# Patient Record
Sex: FEMALE
[De-identification: names, ages, dates, MRNs, and addresses within clinical notes are randomized per-mention and may not be internally consistent; named-entity substitution may affect disease eponyms.]

---

## 2020-01-10 ENCOUNTER — HOSPITAL ENCOUNTER (INPATIENT)
Dept: HOSPITAL 47 - EC | Age: 40
LOS: 3 days | Discharge: HOME | DRG: 388 | End: 2020-01-13
Attending: HOSPITALIST | Admitting: HOSPITALIST
Payer: COMMERCIAL

## 2020-01-10 DIAGNOSIS — G92: ICD-10-CM

## 2020-01-10 DIAGNOSIS — D72.829: ICD-10-CM

## 2020-01-10 DIAGNOSIS — Z98.891: ICD-10-CM

## 2020-01-10 DIAGNOSIS — T40.2X5A: ICD-10-CM

## 2020-01-10 DIAGNOSIS — Z88.6: ICD-10-CM

## 2020-01-10 DIAGNOSIS — Z71.6: ICD-10-CM

## 2020-01-10 DIAGNOSIS — Z98.890: ICD-10-CM

## 2020-01-10 DIAGNOSIS — N83.291: ICD-10-CM

## 2020-01-10 DIAGNOSIS — F11.10: ICD-10-CM

## 2020-01-10 DIAGNOSIS — Z82.5: ICD-10-CM

## 2020-01-10 DIAGNOSIS — K59.03: ICD-10-CM

## 2020-01-10 DIAGNOSIS — F17.200: ICD-10-CM

## 2020-01-10 DIAGNOSIS — K56.7: Primary | ICD-10-CM

## 2020-01-10 DIAGNOSIS — D47.3: ICD-10-CM

## 2020-01-10 DIAGNOSIS — J45.909: ICD-10-CM

## 2020-01-10 DIAGNOSIS — Z83.3: ICD-10-CM

## 2020-01-10 DIAGNOSIS — Z83.79: ICD-10-CM

## 2020-01-10 DIAGNOSIS — Z82.49: ICD-10-CM

## 2020-01-10 LAB
ALBUMIN SERPL-MCNC: 4.3 G/DL (ref 3.5–5)
ALP SERPL-CCNC: 65 U/L (ref 38–126)
ALT SERPL-CCNC: 32 U/L (ref 4–34)
AMMONIA PLAS-SCNC: 35 UMOL/L (ref ?–30)
ANION GAP SERPL CALC-SCNC: 8 MMOL/L
APAP SPEC-MCNC: <10 UG/ML
AST SERPL-CCNC: 36 U/L (ref 14–36)
BASOPHILS # BLD AUTO: 0.2 K/UL (ref 0–0.2)
BASOPHILS NFR BLD AUTO: 2 %
BUN SERPL-SCNC: 9 MG/DL (ref 7–17)
CALCIUM SPEC-MCNC: 9.5 MG/DL (ref 8.4–10.2)
CHLORIDE SERPL-SCNC: 104 MMOL/L (ref 98–107)
CK SERPL-CCNC: 26 U/L (ref 30–135)
CO2 SERPL-SCNC: 25 MMOL/L (ref 22–30)
EOSINOPHIL # BLD AUTO: 0.2 K/UL (ref 0–0.7)
EOSINOPHIL NFR BLD AUTO: 2 %
ERYTHROCYTE [DISTWIDTH] IN BLOOD BY AUTOMATED COUNT: 4.98 M/UL (ref 3.8–5.4)
ERYTHROCYTE [DISTWIDTH] IN BLOOD: 15.3 % (ref 11.5–15.5)
GLUCOSE SERPL-MCNC: 93 MG/DL (ref 74–99)
HCT VFR BLD AUTO: 46.8 % (ref 34–46)
HGB BLD-MCNC: 14.8 GM/DL (ref 11.4–16)
LACTATE BLDV-SCNC: 1.2 MMOL/L (ref 0.7–2)
LYMPHOCYTES # SPEC AUTO: 1.8 K/UL (ref 1–4.8)
LYMPHOCYTES NFR SPEC AUTO: 17 %
MCH RBC QN AUTO: 29.7 PG (ref 25–35)
MCHC RBC AUTO-ENTMCNC: 31.6 G/DL (ref 31–37)
MCV RBC AUTO: 94 FL (ref 80–100)
MONOCYTES # BLD AUTO: 0.5 K/UL (ref 0–1)
MONOCYTES NFR BLD AUTO: 5 %
NEUTROPHILS # BLD AUTO: 8.2 K/UL (ref 1.3–7.7)
NEUTROPHILS NFR BLD AUTO: 74 %
PH UR: 8 [PH] (ref 5–8)
PLATELET # BLD AUTO: 561 K/UL (ref 150–450)
POTASSIUM SERPL-SCNC: 4.8 MMOL/L (ref 3.5–5.1)
PROT SERPL-MCNC: 8.3 G/DL (ref 6.3–8.2)
RBC UR QL: 1 /HPF (ref 0–5)
SALICYLATES SERPL-MCNC: <1 MG/DL
SODIUM SERPL-SCNC: 137 MMOL/L (ref 137–145)
SP GR UR: 1.01 (ref 1–1.03)
SQUAMOUS UR QL AUTO: 9 /HPF (ref 0–4)
UROBILINOGEN UR QL STRIP: <2 MG/DL (ref ?–2)
WBC # BLD AUTO: 11.1 K/UL (ref 3.8–10.6)
WBC # UR AUTO: 8 /HPF (ref 0–5)

## 2020-01-10 PROCEDURE — 74022 RADEX COMPL AQT ABD SERIES: CPT

## 2020-01-10 PROCEDURE — 80053 COMPREHEN METABOLIC PANEL: CPT

## 2020-01-10 PROCEDURE — 81025 URINE PREGNANCY TEST: CPT

## 2020-01-10 PROCEDURE — 83520 IMMUNOASSAY QUANT NOS NONAB: CPT

## 2020-01-10 PROCEDURE — 93975 VASCULAR STUDY: CPT

## 2020-01-10 PROCEDURE — 82140 ASSAY OF AMMONIA: CPT

## 2020-01-10 PROCEDURE — 80048 BASIC METABOLIC PNL TOTAL CA: CPT

## 2020-01-10 PROCEDURE — 96360 HYDRATION IV INFUSION INIT: CPT

## 2020-01-10 PROCEDURE — 81001 URINALYSIS AUTO W/SCOPE: CPT

## 2020-01-10 PROCEDURE — 85025 COMPLETE CBC W/AUTO DIFF WBC: CPT

## 2020-01-10 PROCEDURE — 85027 COMPLETE CBC AUTOMATED: CPT

## 2020-01-10 PROCEDURE — 76830 TRANSVAGINAL US NON-OB: CPT

## 2020-01-10 PROCEDURE — 80329 ANALGESICS NON-OPIOID 1 OR 2: CPT

## 2020-01-10 PROCEDURE — 99285 EMERGENCY DEPT VISIT HI MDM: CPT

## 2020-01-10 PROCEDURE — 82550 ASSAY OF CK (CPK): CPT

## 2020-01-10 PROCEDURE — 80320 DRUG SCREEN QUANTALCOHOLS: CPT

## 2020-01-10 PROCEDURE — 96361 HYDRATE IV INFUSION ADD-ON: CPT

## 2020-01-10 PROCEDURE — 74177 CT ABD & PELVIS W/CONTRAST: CPT

## 2020-01-10 PROCEDURE — 36415 COLL VENOUS BLD VENIPUNCTURE: CPT

## 2020-01-10 PROCEDURE — 83605 ASSAY OF LACTIC ACID: CPT

## 2020-01-10 PROCEDURE — 93005 ELECTROCARDIOGRAM TRACING: CPT

## 2020-01-10 PROCEDURE — 70450 CT HEAD/BRAIN W/O DYE: CPT

## 2020-01-10 PROCEDURE — 80306 DRUG TEST PRSMV INSTRMNT: CPT

## 2020-01-10 RX ADMIN — ACETAMINOPHEN PRN MLS/HR: 10 INJECTION, SOLUTION INTRAVENOUS at 20:24

## 2020-01-10 RX ADMIN — CEFAZOLIN SCH MLS/HR: 330 INJECTION, POWDER, FOR SOLUTION INTRAMUSCULAR; INTRAVENOUS at 18:07
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
## 2020-01-10 NOTE — CT
EXAMINATION TYPE: CT abdomen pelvis w con

 

DATE OF EXAM: 1/10/2020

 

COMPARISON: CT abdomen pelvis 1/7/2020

 

INDICATION: obstruction

 

DLP: 884.3 mGycm, Automated exposure control for dose reduction was used.

 

CONTRAST:  100 mL of Isovue 300. 

                        Study performed without Oral Contrast

 

TECHNIQUE: Axial images were obtained from above the diaphragm to the pubic rami in the axial plane a
t 5 mm thick sections.  Reconstructed images are reviewed on the computer in the coronal plane.  

 

FINDINGS:

 

Limited CT sections are obtained the lung bases.  The lung bases are clear.

 

CT ABDOMEN:

 

Liver: Normal

 

Spleen: Normal

 

Pancreas: Normal

 

Adrenal glands: The adrenal glands are normal.

 

Gallbladder: Normal  

 

Kidneys: No masses are evident. No hydronephrosis is present.   Bilateral renal cysts are present.  D
elayed images were obtained through the kidneys, which remain unremarkable.

 

Aorta: Normal

 

Inferior vena cava: Normal.

 

CT PELVIS: 

There is some fluid-filled loops of bowel within the pelvis. There are loops of bowel which are incom
pletely distended or lack oral contrast limiting their evaluation.

 

Appendix: Normal as visualized.

 

Urinary bladder: Normal. 

 

Genitourinary structures: Uterus appears normal. Multiple fluid filled areas are within the right adn
exa likely cysts. The largest measures 4.9 cm. Left ovary may contain a couple small cysts.

 

Osseous structures: No suspicious lytic or sclerotic lesions.

 

IMPRESSIONS:

1.  Bilateral ovarian cysts larger on the right. Additional workup with pelvic ultrasound is recommen
ded. Transvaginal ultrasound may have better visualization given the large amount of bowel gas presen
t at this time. Findings appear stable from 1/7/2020.

## 2020-01-10 NOTE — CT
EXAMINATION TYPE: CT brain wo con

 

DATE OF EXAM: 1/10/2020

 

COMPARISON: 9/6/2012

 

HISTORY: Weakness.

 

CT DLP: 1173.4 mGycm.  Automated Exposure Control for Dose Reduction was Utilized.

 

 

TECHNIQUE: CT scan of the head is performed without contrast.

 

FINDINGS:   There is no acute intracranial hemorrhage, mass effect, or midline shift identified.  The
 ventricles and sulci are within normal limits in size.  The globes are intact and the visualized sin
uses are clear.

 

IMPRESSION:  No acute intracranial hemorrhage, mass effect, or midline shift is seen.

## 2020-01-10 NOTE — XR
EXAMINATION TYPE: XR abdomen acute w cxr

 

DATE OF EXAM: 1/10/2020

 

COMPARISON: 1/7/2020

 

HISTORY: Abdomen pain

 

TECHNIQUE: Abdomen is examined in the supine and upright views exam is supplemented with a frontal ch
est.

 

FINDINGS: Heart size is normal. Pulmonary vasculature is normal. The lungs are clear.

 

No free air is under the diaphragm. No suspicious air-fluid levels or differential air-fluid levels a
re present. Marked fecal debris is throughout the colon to the descending colon region.

 

IMPRESSION:

1.  Marked fecal retention through the ascending transverse and descending colon.

## 2020-01-11 LAB
ANION GAP SERPL CALC-SCNC: 6 MMOL/L
BUN SERPL-SCNC: 10 MG/DL (ref 7–17)
CALCIUM SPEC-MCNC: 9 MG/DL (ref 8.4–10.2)
CHLORIDE SERPL-SCNC: 108 MMOL/L (ref 98–107)
CO2 SERPL-SCNC: 22 MMOL/L (ref 22–30)
ERYTHROCYTE [DISTWIDTH] IN BLOOD BY AUTOMATED COUNT: 4.34 M/UL (ref 3.8–5.4)
ERYTHROCYTE [DISTWIDTH] IN BLOOD: 13.7 % (ref 11.5–15.5)
GLUCOSE SERPL-MCNC: 81 MG/DL (ref 74–99)
HCT VFR BLD AUTO: 41.1 % (ref 34–46)
HGB BLD-MCNC: 13.1 GM/DL (ref 11.4–16)
MCH RBC QN AUTO: 30.2 PG (ref 25–35)
MCHC RBC AUTO-ENTMCNC: 31.8 G/DL (ref 31–37)
MCV RBC AUTO: 94.7 FL (ref 80–100)
PLATELET # BLD AUTO: 443 K/UL (ref 150–450)
POTASSIUM SERPL-SCNC: 4.8 MMOL/L (ref 3.5–5.1)
SODIUM SERPL-SCNC: 136 MMOL/L (ref 137–145)
WBC # BLD AUTO: 8.4 K/UL (ref 3.8–10.6)

## 2020-01-11 RX ADMIN — ACETAMINOPHEN PRN MLS/HR: 10 INJECTION, SOLUTION INTRAVENOUS at 12:54

## 2020-01-11 RX ADMIN — CEFAZOLIN SCH MLS/HR: 330 INJECTION, POWDER, FOR SOLUTION INTRAMUSCULAR; INTRAVENOUS at 07:08

## 2020-01-11 RX ADMIN — CEFAZOLIN SCH MLS/HR: 330 INJECTION, POWDER, FOR SOLUTION INTRAMUSCULAR; INTRAVENOUS at 16:51

## 2020-01-11 RX ADMIN — CEFAZOLIN SCH: 330 INJECTION, POWDER, FOR SOLUTION INTRAMUSCULAR; INTRAVENOUS at 22:18

## 2020-01-11 RX ADMIN — CEFAZOLIN SCH MLS/HR: 330 INJECTION, POWDER, FOR SOLUTION INTRAMUSCULAR; INTRAVENOUS at 00:05

## 2020-01-11 RX ADMIN — PANTOPRAZOLE SODIUM SCH MG: 40 INJECTION, POWDER, FOR SOLUTION INTRAVENOUS at 07:07

## 2020-01-11 RX ADMIN — ACETAMINOPHEN PRN MLS/HR: 10 INJECTION, SOLUTION INTRAVENOUS at 04:17

## 2020-01-11 NOTE — US
EXAMINATION TYPE: US transvaginal

 

DATE OF EXAM: 1/11/2020

 

COMPARISON: Previous study dated 12/21/2012.

 

CLINICAL HISTORY: ovarian cyst abominal pain .

 

TECHNIQUE:  Transvaginal (TV).  

 

Date of LMP:  01/09/2020

 

EXAM MEASUREMENTS:

 

Uterus:  9.6 x 4.2 x 7.0  cm

Endometrial Stripe: 1.6 cm

Right Ovary:  8.4 x 3.9 x 7.4 cm

Left Ovary:  4.2 x 1.8 x 2.3 cm

 

Technically difficult exam performed portably on patient with chains.

 

1. Uterus:  Anteverted   wnl

2. Endometrium:  measures 1.6 cm, patient states LMP 3 days ago 

3. Right Ovary:  large septated cyst measures 6.7 x 3.7 x 5.3 cm

4. Left Ovary:

**Spectral, color and waveform doppler imaging shows good arterial and venous flow within the ovaries
; there is no evidence for ovarian torsion.

5. Bilateral Adnexa:  wnl

6. Posterior cul-de-sac:  no free fluid

 

IMPRESSION: 

1. ENLARGED ENDOMETRIAL STRIPE. FOLLOW-UP IS SUGGESTED TO ENSURE RETURNED TO NORMAL.

2. COMPLEX 6.7 CM RIGHT OVARIAN CYST.

## 2020-01-11 NOTE — P.PN
Subjective


Progress Note Date: 01/11/20


Principal diagnosis: 


abd pain


Patient is a 38 yo  female with a hx of asthma, tobacco abuse, 

polysubstance abuse, and polynephritis who presented to the emergency department

from [facility] due to altered mentation.  She was seen in the ER on the evening of 

1/7/20 secondary to having possibly swallowed a bag of heroin at that point in 

time she had a computed tomography scan done which showed constipation and fecal

impaction as well as a large ovarian cyst.   At that point in time her UDS was 

positive for meth, opiates, and cocaine.  She was ultimately cleared by the 

emergency department to return to [facility].  She was brought back in on 1/10/2020 

secondary to altered mentation and worsening abdominal pain.  Repeat computed 

tomography scan of the abdomen and pelvis showed dilated small bowel loops with 

no transition point and increased fecal retention as well as an ovarian cyst.  

She was admitted for further monitoring with concern that she would develop a 

complete obstruction or possible increased absorption of the drugs.





Patient seen and examined at bedside.  She reports abdominal pain.  Initially 

she was sleeping when I entered the room.  She is asking for more pain 

medications to control her pain and a diet but complaining of her abdominal 

pain.  She denies any nausea or vomiting.  We did discuss that she has ovarian 

cysts and will need outpatient follow-up with OB/GYN and a transvaginal 

ultrasound.








Objective





- Vital Signs


Vital signs: 


                                   Vital Signs











Temp  98.0 F   01/11/20 13:49


 


Pulse  73   01/11/20 13:49


 


Resp  18   01/11/20 13:49


 


BP  109/73   01/11/20 13:49


 


Pulse Ox  97   01/11/20 13:49








                                 Intake & Output











 01/10/20 01/11/20 01/11/20





 18:59 06:59 18:59


 


Intake Total  0 0


 


Balance  0 0


 


Weight 72.575 kg 72.575 kg 


 


Intake:   


 


  Oral  0 0


 


Other:   


 


  Voiding Method   Toilet


 


  # Voids  0 0














- Exam


General: non toxic, no distress, appears at stated age


Derm: multiple circular scars on upper arms, warm, dry


Head: atraumatic, normocephalic, symmetric


Eyes: EOMI, no lid lag, anicteric sclera


Mouth: no lip lesion, mucus membranes moist


Cardiovascular: S1S2 reg, no murmur, positive posterior tibial pulse bilateral, 


Lungs: Decreased bs bilateral, no rhonchi, no rales , no accessory muscle use


Abdominal: soft,  + tender to palpation diffusely, no guarding, no appreciable 

organomegaly


Ext: no gross muscle atrophy, no edema, no contractures


Neuro:  CN II-XI grossly intact, no focal neuro deficits


Psych: Alert, oriented, lethargic 











- Labs


CBC & Chem 7: 


                                 01/11/20 11:47





                                 01/11/20 11:47


Labs: 


                  Abnormal Lab Results - Last 24 Hours (Table)











  01/10/20 01/11/20 Range/Units





  13:55 11:47 


 


Sodium   136 L  (137-145)  mmol/L


 


Chloride   108 H  ()  mmol/L


 


Urine Appearance  Cloudy H   (Clear)  


 


Urine WBC  8 H   (0-5)  /hpf


 


Ur Squamous Epith Cells  9 H   (0-4)  /hpf


 


Amorphous Sediment  Rare H   (None)  /hpf


 


Urine Bacteria  Rare H   (None)  /hpf


 


Urine Mucus  Rare H   (None)  /hpf


 


Urine Cocaine Screen  Detected H   (NotDetected)  














Assessment and Plan


Assessment: 


illeus


- Possible ingestion of fatty containing heroin which may be leading to ileus 

versus partial obstruction


-Continue to monitor for fecal output


-Await general surgery recommendations


-Avoid additional narcotic agents at this point in time


-Maintain nothing by mouth diet until seen by surgery





Complex right ovarian cyst with enlarged endometrial stripe


- Recommend outpatient GYN for possible cyst excision 





Altered mentation


- appears to be improved


- continue to follow likely either due to intoxication or withdrawal 





Polysubstance abuse


- Attempt to limit any sedative or narcotic medications.  Attempt pain control 

with IV acetaminophen and Toradol if no plans for surgical intervention.





Tobacco abuse


- cessation


- nicotine replacement 





Leukocytosis, resolved





DVT prophylaxis: SCDs


Discussed with: Patient, nursing


Anticipated discharge: in AM


Anticipated discharge place: return to [facility]


A total of 25 minutes was spent on the care of this complex patient more than 

50% of the time was spent in counseling and care coordination.
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
## 2020-01-12 VITALS — RESPIRATION RATE: 16 BRPM

## 2020-01-12 LAB
ANION GAP SERPL CALC-SCNC: 4 MMOL/L
BUN SERPL-SCNC: 9 MG/DL (ref 7–17)
CALCIUM SPEC-MCNC: 8.8 MG/DL (ref 8.4–10.2)
CHLORIDE SERPL-SCNC: 111 MMOL/L (ref 98–107)
CO2 SERPL-SCNC: 23 MMOL/L (ref 22–30)
ERYTHROCYTE [DISTWIDTH] IN BLOOD BY AUTOMATED COUNT: 4.15 M/UL (ref 3.8–5.4)
ERYTHROCYTE [DISTWIDTH] IN BLOOD: 13.7 % (ref 11.5–15.5)
GLUCOSE SERPL-MCNC: 80 MG/DL (ref 74–99)
HCT VFR BLD AUTO: 38.6 % (ref 34–46)
HGB BLD-MCNC: 12.6 GM/DL (ref 11.4–16)
MCH RBC QN AUTO: 30.3 PG (ref 25–35)
MCHC RBC AUTO-ENTMCNC: 32.6 G/DL (ref 31–37)
MCV RBC AUTO: 93 FL (ref 80–100)
PLATELET # BLD AUTO: 399 K/UL (ref 150–450)
POTASSIUM SERPL-SCNC: 4.5 MMOL/L (ref 3.5–5.1)
SODIUM SERPL-SCNC: 138 MMOL/L (ref 137–145)
WBC # BLD AUTO: 6 K/UL (ref 3.8–10.6)

## 2020-01-12 RX ADMIN — CEFAZOLIN SCH: 330 INJECTION, POWDER, FOR SOLUTION INTRAMUSCULAR; INTRAVENOUS at 14:25

## 2020-01-12 RX ADMIN — CEFAZOLIN SCH: 330 INJECTION, POWDER, FOR SOLUTION INTRAMUSCULAR; INTRAVENOUS at 05:58

## 2020-01-12 RX ADMIN — PANTOPRAZOLE SODIUM SCH MG: 40 INJECTION, POWDER, FOR SOLUTION INTRAVENOUS at 06:49

## 2020-01-12 NOTE — XR
EXAMINATION TYPE: XR abdomen acute w cxr

 

DATE OF EXAM: 1/12/2020

 

COMPARISON: 1/10/2020

 

HISTORY: Abdominal pain

 

TECHNIQUE: Chest x-ray with supine and upright abdomen 4 views

 

FINDINGS: There is no heart failure nor confluent pneumonic infiltrate. Costophrenic angles are clear
. There are no hilar masses. Bowel gas pattern is normal. There is no sign of intestinal obstruction 
or pneumoperitoneum. Fecal pattern is normal. There is no evidence of a mass. There are no pathologic
 calcifications over the kidneys.

 

IMPRESSION: Normal chest. Nonacute abdomen. There is decreased fecal material compared to old exam.

## 2020-01-12 NOTE — P.PN
Subjective


Progress Note Date: 01/12/20 (delayed chartin seen at 1230)


Principal diagnosis: 


abd pain


Patient is a 38 yo  female with a hx of asthma, tobacco abuse, 

polysubstance abuse, and prior polynephritis who presented to the emergency 

department from [facility] due to altered mentation.  She was seen in the ER on the 

evening of 1/7/20 secondary to having possibly swallowed a bag of heroin at that

point in time she had a computed tomography scan done which showed constipation 

and fecal impaction as well as a large ovarian cyst.   At that point in time her

UDS was positive for meth, opiates, and cocaine.  She was ultimately cleared by 

the emergency department to return to [facility].  She was brought back in on 1

/10/2020 secondary to altered mentation and worsening abdominal pain.  Repeat 

computed tomography scan of the abdomen and pelvis showed dilated small bowel 

loops with no transition point and increased fecal retention as well as an 

ovarian cyst.  She was admitted for further monitoring with concern that she 

would develop a complete obstruction or possible increased absorption of the 

drugs. Seen by surgery with no plans for surgical intervention. 





Patient seen and examined at bedside.  She complains of abd pain and points that

it is both on the right and the left side. No nausea. Asking for increased pain 

medications and an increased diet. 


Explained that we would not be using an opiate pain medications due to not 

knowing if she passed the bag she had swallowed and what was in the bag. I also 

informed her that due to the amount and location of stool on her CT we would not

be increasing her diet until she had a bowel movement. 





Objective





- Vital Signs


Vital signs: 


                                   Vital Signs











Temp  97.7 F   01/12/20 14:05


 


Pulse  68   01/12/20 14:05


 


Resp  18   01/12/20 14:05


 


BP  110/73   01/12/20 14:05


 


Pulse Ox  99   01/12/20 14:05








                                 Intake & Output











 01/11/20 01/12/20 01/12/20





 18:59 06:59 18:59


 


Intake Total 0 875 


 


Balance 0 875 


 


Intake:   


 


  Oral 0 875 


 


Other:   


 


  Voiding Method Toilet Toilet Toilet


 


  # Voids 0 2 2














- Exam


General: non toxic, no distress, appears at stated age


Derm: multiple circular scars on upper arms, warm, dry


Head: atraumatic, normocephalic, symmetric


Eyes: EOMI, no lid lag, anicteric sclera


Mouth: no lip lesion, mucus membranes moist


Cardiovascular: S1S2 reg, no murmur, positive posterior tibial pulse bilateral, 


Lungs: CTA bilateral, no rhonchi, no rales , no accessory muscle use


Abdominal: soft,  + tender to palpation diffusely, no guarding, no appreciable 

organomegaly


Ext: no gross muscle atrophy, no edema, no contractures


Neuro:  CN II-XI grossly intact, no focal neuro deficits


Psych: Alert, oriented, lethargic 











- Labs


CBC & Chem 7: 


                                 01/12/20 07:51





                                 01/12/20 07:51


Labs: 


                  Abnormal Lab Results - Last 24 Hours (Table)











  01/12/20 Range/Units





  07:51 


 


Chloride  111 H  ()  mmol/L














Assessment and Plan


Assessment: 


illeus


- Possible ingestion of bag containing heroin which may be leading to ileus 

versus partial obstruction


   - check acute abdominal series 


-Continue to monitor for fecal output


-general surgery recommendations appreciated 


-Avoid additional narcotic agents at this point in time


-Tylenol as need for pain considering motrin allergy


- clear liquid diet until she has a bowel movement. 





Complex right ovarian cyst with enlarged endometrial stripe


- Recommend outpatient GYN for possible cyst excision/Evaluation 





Polysubstance abuse


- Attempt to limit any sedative or narcotic medications.  Attempt pain control 

with IV acetaminophen and Toradol if no plans for surgical intervention.





Tobacco abuse


- cessation


- nicotine replacement 





Leukocytosis, resolved


Toxic encephalopathy, resolved 








Back to [facility] once we verify that she is passing stool





DVT prophylaxis: SCDs


Discussed with: Patient, nursing


Anticipated discharge: in AM


Anticipated discharge place: return to [facility]


A total of 25 minutes was spent on the care of this complex patient more than 

50% of the time was spent in counseling and care coordination.

## 2020-01-13 VITALS — SYSTOLIC BLOOD PRESSURE: 100 MMHG | HEART RATE: 67 BPM | DIASTOLIC BLOOD PRESSURE: 63 MMHG | TEMPERATURE: 98.6 F

## 2020-01-13 RX ADMIN — PANTOPRAZOLE SODIUM SCH: 40 INJECTION, POWDER, FOR SOLUTION INTRAVENOUS at 09:13

## 2020-01-13 NOTE — P.PN
Subjective


Progress Note Date: 01/12/20





Patient had BM. Tolerating clears. Abdominal pain improved





Objective





- Vital Signs


Vital signs: 


                                   Vital Signs











Temp  97.7 F   01/13/20 05:37


 


Pulse  61   01/13/20 05:37


 


Resp  16   01/13/20 05:37


 


BP  99/64   01/13/20 05:37


 


Pulse Ox  99   01/13/20 05:37








                                 Intake & Output











 01/12/20 01/13/20 01/13/20





 18:59 06:59 18:59


 


Intake Total 300  


 


Balance 300  


 


Intake:   


 


  Oral 300  


 


Other:   


 


  Voiding Method Toilet Toilet 


 


  # Voids 2 2 














- Constitutional


General appearance: Present: cooperative





- Respiratory


Details: 





nonlabored





- Cardiovascular


Rhythm: regular





- Gastrointestinal


Gastrointestinal Comment(s): 





S/NT/ND





- Labs


CBC & Chem 7: 


                                 01/12/20 07:51





                                 01/12/20 07:51





Assessment and Plan


Assessment: 


Abdominal pain


Constipation


Plan: 


Advance diet as tolerated. Patient is having BM and pain is resolved. No plans 

for surgical intervention. Cleared from surgical standpoint. Continue with stool

softeners and suppository as needed.

## 2020-01-13 NOTE — P.DS
Providers


Date of admission: 


01/12/20 14:07





Attending physician: 


Joseph Kessler MD





Consults: 





                                        





01/10/20 17:38


Consult Physician Urgent 


   Consulting Provider: Jose Armando Garza


   Consult Reason/Comments: abd pain, drug ingestion, ileus


   Do you want consulting provider notified?: Already Contacted











Primary care physician: 


Stated None





Hospital Course: 





Date of discharge: 01/13/10 intensity





Consultants: Gen. surgery





Reason for admission:


Abdominal pain, ileus





Discharge diagnosis:


1.  No specific ileus likely from opiate abuse and constipation


2.  Opioid abuse


3.  Constipation








History of present illness:


Patient is a 38 yo  female with a hx of asthma, tobacco abuse, 

polysubstance abuse, and prior polynephritis who presented to the emergency 

department from half-way due to altered mentation.  She was seen in the ER on the 

evening of 1/7/20 secondary to having possibly swallowed a bag of heroin at that

point in time she had a computed tomography scan done which showed constipation 

and fecal impaction as well as a large ovarian cyst.   At that point in time her

UDS was positive for meth, opiates, and cocaine.  She was ultimately cleared by 

the emergency department to return to half-way.  She was brought back in on 

1/10/2020 secondary to altered mentation and worsening abdominal pain.  Repeat 

computed tomography scan of the abdomen and pelvis showed dilated small bowel 

loops with no transition point and increased fecal retention as well as an 

ovarian cyst.  She was admitted for further monitoring with concern that she 

would develop a complete obstruction or possible increased absorption of the 

drugs. Seen by surgery with no plans for surgical intervention. 





Hospital course:


Patient was monitored here in the hospital for 3 days.  She was placed nothing 

by mouth.  General surgery valid to the patient.  She was given IV fluids.


Patient remained asymptomatic with normal mentation and no abdominal pain.  She 

does have a nausea or vomiting.  She had a normal bowel movement.  She was 

passing gas.  General surgery started the diet and cleared the patient for 

discharge.  Patient tolerated regular diet without any difficulties.  In 

discussion with the patient she did not want to reveal what she took.  Further 

investigation during this stay straightaway from disability that she ingested a 

hearing pocket.  Probably she was just using straight drugs.


Repeat x-ray of the abdomen showed normal gas pattern without any significant 

acute changes.


Physical examination under discharge was nonfocal.  Her abdomen is soft 

nontender nondistended bowel sounds are present.  Her mentation was normal 

pupils were normal and reactive to light.





Patient was discharge back to half-way.





Given the finding of the large complex right ovarian cyst I advised patient to 

follow-up with gynecologist as soon as possible and that recommendation was 

placed in her discharge paperwork.





Patient Condition at Discharge: Serious





Plan - Discharge Summary


New Discharge Prescriptions: 


No Action


   Sulfamethox-Tmp 800-160Mg [Bactrim -160 mg] 1 tab PO Q12HR


   Ibuprofen [Motrin Ib] 400 mg PO BID PRN


     PRN Reason: Pain


Discharge Medication List





Ibuprofen [Motrin Ib] 400 mg PO BID PRN 01/10/20 [History]


Sulfamethox-Tmp 800-160Mg [Bactrim -160 mg] 1 tab PO Q12HR 01/10/20 

[History]








Follow up Appointment(s)/Referral(s): 


None,Stated [Primary Care Provider] - 1-2 days

## 2020-01-13 NOTE — P.PN
Subjective





Chart was reviewed patient was examined.


Today she's doing about the same.  She had bowel movements yesterday.  She is 

passing gas today.  She tolerated clear liquid diet.  She does not have any 

particular pain or discomfort.  She does not have any nausea or vomiting.





Objective





- Vital Signs


Vital signs: 


                                   Vital Signs











Temp  97.7 F   01/13/20 05:37


 


Pulse  61   01/13/20 05:37


 


Resp  16   01/13/20 05:37


 


BP  99/64   01/13/20 05:37


 


Pulse Ox  99   01/13/20 05:37








                                 Intake & Output











 01/12/20 01/13/20 01/13/20





 18:59 06:59 18:59


 


Intake Total 300  


 


Balance 300  


 


Intake:   


 


  Oral 300  


 


Other:   


 


  Voiding Method Toilet Toilet 


 


  # Voids 2 2 














- Exam





Vital Signs: I have reviewed the vital signs.


GENERAL: Well-nourished, Well-developed , no apparent distress, cooperative


Eyes: PERRL, extraoculry movements intact,  clear conjunctiva


Head: : Atraumatic external nose and ears,  oropharyngeal mucosa is moist 

without lesions or exudates


Neck: Symmetric, trachea midline, No thyromegaly, no masses or neck vain 

pulsation, no neck rigidity


CVS: +S1/S2, No murmurs or gallops. Peripheral pulses 2+ and equal in all 

extremities.


RESP: Unlabored respiratory effort. Clear to auscultation bilaterally.


Abdomen: Bowel sounds present in all 4 quadrants, Soft to palpation, 

Nontender/Nondistended, No hepatosplenomegaly, no hernias or masses, no CVA 

tnderness 


Musculoskeletal:  Extremities w/o deformity, No cyanosis or clubbing, no joint 

swelling 


Skin: Warm, Dry. No rashes or lesions


Neuro: CNs II-XII grossly intact, motor strenght 5/5 i upper and lower 

extremities, no clonus, patellar DTRs 2+ and sympetrical


Psych: Awake, Alert, & Oriented (AAO) x3 Appropriate mood and affect





- Labs


CBC & Chem 7: 


                                 01/12/20 07:51





                                 01/12/20 07:51





Assessment and Plan


Assessment: 





1.  Abdominal pain and ileus


Surgical consultation appreciated


Patient had a bowel movement and asymptomatic tolerating clear liquid diet and 

she has been cleared by surgery for discharge


We will plan to advance the diet and patient tolerates consider discharge





2.  Right ovarian cyst


Patient will need follow-up with gynecology on outpatient basis within 1 week 

after discharge





3.  acute toxic metabolic encephalopathy


Felt to be due to polysubstance abuse


Resolved and patient is currently baseline